# Patient Record
(demographics unavailable — no encounter records)

---

## 2024-10-17 NOTE — DISCUSSION/SUMMARY
[FreeTextEntry1] : stable exam CP- +risk r/o CAD f/u for stress echo, obtain coronary Ca+ score to evaluate risk Lipids stable, continue statin, check profile [EKG obtained to assist in diagnosis and management of assessed problem(s)] : EKG obtained to assist in diagnosis and management of assessed problem(s)

## 2024-10-17 NOTE — HISTORY OF PRESENT ILLNESS
[FreeTextEntry1] : looking to establish cardiac care noted episodes of CP concerned with lipids and FHX

## 2025-01-07 NOTE — DISCUSSION/SUMMARY
[FreeTextEntry1] : stable exam CP- stable, non cardiac, normal stress echo results discussed lipids/ mild elevated calcium score, ldl not ideal however will continue current statin dose and repeat profile in 6 months when off letrozole